# Patient Record
Sex: MALE | Race: WHITE | HISPANIC OR LATINO | Employment: FULL TIME | URBAN - METROPOLITAN AREA
[De-identification: names, ages, dates, MRNs, and addresses within clinical notes are randomized per-mention and may not be internally consistent; named-entity substitution may affect disease eponyms.]

---

## 2022-12-15 PROBLEM — Z12.11 SCREENING FOR COLON CANCER: Status: ACTIVE | Noted: 2022-12-15

## 2022-12-15 PROBLEM — E66.01 MORBID OBESITY (HCC): Status: ACTIVE | Noted: 2022-12-15

## 2022-12-15 PROBLEM — Z11.4 SCREENING FOR HIV (HUMAN IMMUNODEFICIENCY VIRUS): Status: ACTIVE | Noted: 2022-12-15

## 2022-12-15 PROBLEM — Z11.59 ENCOUNTER FOR HEPATITIS C SCREENING TEST FOR LOW RISK PATIENT: Status: ACTIVE | Noted: 2022-12-15

## 2022-12-19 PROBLEM — R74.01 ELEVATED ALT MEASUREMENT: Status: ACTIVE | Noted: 2022-12-19

## 2022-12-19 PROBLEM — R73.01 IMPAIRED FASTING GLUCOSE: Status: ACTIVE | Noted: 2022-12-19

## 2023-01-23 ENCOUNTER — TELEPHONE (OUTPATIENT)
Age: 48
End: 2023-01-23

## 2023-01-23 NOTE — TELEPHONE ENCOUNTER
----- Message from Liz Sarmiento, [address] sent at 12/19/2022  8:25 AM EST -----  Can you please call this patient to set up and appointment to review their labs  Thank you!

## 2023-01-27 ENCOUNTER — TELEPHONE (OUTPATIENT)
Dept: GASTROENTEROLOGY | Facility: CLINIC | Age: 48
End: 2023-01-27

## 2023-01-27 NOTE — TELEPHONE ENCOUNTER
Called to reschedule from [address] due to [id number] [address] Aldrich phone number with  on the line - no answer/no answering machine  After checking medical communication form called wife, who did not require an   Pt rescheduled       Scheduled date of colonoscopy (as of today): 04/27/2023  Physician performing colonoscopy: Dr Jessica Jones   Location of colonoscopy: [place]  Bowel prep reviewed with patient: Miralax/Dulcolax   Instructions reviewed with patient by: [place] - mailed 01/27/2023  Clearances: N/A

## 2023-02-13 PROBLEM — Z12.11 SCREENING FOR COLON CANCER: Status: RESOLVED | Noted: 2022-12-15 | Resolved: 2023-02-13

## 2023-11-15 ENCOUNTER — TELEPHONE (OUTPATIENT)
Age: 48
End: 2023-11-15

## 2024-01-24 ENCOUNTER — TELEPHONE (OUTPATIENT)
Age: 49
End: 2024-01-24

## 2024-01-24 NOTE — TELEPHONE ENCOUNTER
Unable to contact letter to schedule appointment returned to sender not deliverable as addressed unable to forward.

## 2024-01-27 NOTE — TELEPHONE ENCOUNTER
01/26/24 11:55 PM        The office's request has been received, reviewed, and the patient chart updated. The PCP has successfully been removed with a patient attribution note. This message will now be completed.        Thank you  Kathy Brown